# Patient Record
Sex: FEMALE | Race: WHITE | URBAN - METROPOLITAN AREA
[De-identification: names, ages, dates, MRNs, and addresses within clinical notes are randomized per-mention and may not be internally consistent; named-entity substitution may affect disease eponyms.]

---

## 2017-01-02 DIAGNOSIS — E03.8 OTHER SPECIFIED HYPOTHYROIDISM: Primary | ICD-10-CM

## 2017-01-02 NOTE — TELEPHONE ENCOUNTER
Change in pharmacy    Pending Prescriptions:                       Disp   Refills    levothyroxine (SYNTHROID/LEVOTHROID) 75 M*90 tab*3            Sig: Take 1 tablet (75 mcg) by mouth daily         Last Written Prescription Date: 10-11-16 to local pharm  Last Quantity: 90, # refills: 3  Last Office Visit with [facility], [facility] or [facility] prescribing provider: 12-20-16 Dr John        TSH   Date Value Ref Range Status   08/30/2016 0.21* 0.40 - 4.00 mU/L Final     RT eLe Ann (R)

## 2017-01-03 RX ORDER — LEVOTHYROXINE SODIUM 75 UG/1
75 TABLET ORAL DAILY
Qty: 90 TABLET | Refills: 2 | Status: SHIPPED | OUTPATIENT
Start: 2017-01-03 | End: 2017-09-25

## 2017-01-03 NOTE — TELEPHONE ENCOUNTER
Prescription approved per [facility] Refill Protocol.  Fadia Em RN

## 2017-01-07 ENCOUNTER — MYC MEDICAL ADVICE (OUTPATIENT)
Dept: FAMILY MEDICINE | Facility: CLINIC | Age: 27
End: 2017-01-07

## 2017-03-31 DIAGNOSIS — Z30.019 ENCOUNTER FOR INITIAL PRESCRIPTION OF CONTRACEPTIVES: ICD-10-CM

## 2017-03-31 RX ORDER — ETONOGESTREL/ETHINYL ESTRADIOL .12-.015MG
RING, VAGINAL VAGINAL
Qty: 3 EACH | Refills: 1 | Status: SHIPPED | OUTPATIENT
Start: 2017-03-31 | End: 2017-09-15

## 2017-03-31 NOTE — TELEPHONE ENCOUNTER
Prescription approved per [facility] Refill Protocol.  Mariann Stark RN

## 2017-03-31 NOTE — TELEPHONE ENCOUNTER
Nuvaring      Last Written Prescription Date: 08/30/16  Last Fill Quantity: 3 each, # refills: 1  Last Office Visit with [facility], [facility] or [facility] prescribing provider: 12/20/16       BP Readings from Last 3 Encounters:   12/20/16 131/86   08/30/16 119/82   05/02/16 128/88     Date of last Breast Exam: 12/20/16    Ashley Mckeon MA

## 2017-05-07 DIAGNOSIS — F41.9 ANXIETY: ICD-10-CM

## 2017-05-08 NOTE — TELEPHONE ENCOUNTER
sertraline (ZOLOFT) 100 MG tablet 90 tablet 1 8/30/2016       Last Written Prescription Date: 08/30/2016  Last Fill Quantity: 90, # refills: 1  Last Office Visit with [facility] primary care provider:  12/20/2016       Last PHQ-9 score on record=   PHQ-9 SCORE 3/14/2014   Total Score 0

## 2017-05-09 RX ORDER — SERTRALINE HYDROCHLORIDE 100 MG/1
TABLET, FILM COATED ORAL
Qty: 90 TABLET | Refills: 1 | Status: SHIPPED | OUTPATIENT
Start: 2017-05-09 | End: 2017-10-30

## 2017-09-15 DIAGNOSIS — Z30.019 ENCOUNTER FOR INITIAL PRESCRIPTION OF CONTRACEPTIVES: ICD-10-CM

## 2017-09-15 RX ORDER — ETONOGESTREL/ETHINYL ESTRADIOL .12-.015MG
RING, VAGINAL VAGINAL
Qty: 3 EACH | Refills: 0 | Status: SHIPPED | OUTPATIENT
Start: 2017-09-15 | End: 2017-11-17

## 2017-09-15 NOTE — TELEPHONE ENCOUNTER
NUVARING 0.12-0.015 MG/24HR vaginal ring        Last Written Prescription Date: 3/31/2017  Last Fill Quantity: 3,  # refills: 1   Last Office Visit with [facility], [facility] or [facility] prescribing provider: 12/20/2016

## 2017-09-15 NOTE — TELEPHONE ENCOUNTER
Prescription approved per [facility] Refill Protocol.  Fadia Em RN

## 2017-09-25 DIAGNOSIS — E03.8 OTHER SPECIFIED HYPOTHYROIDISM: ICD-10-CM

## 2017-09-25 NOTE — TELEPHONE ENCOUNTER
levothyroxine (SYNTHROID/LEVOTHROID) 75 MCG tablet       Last Written Prescription Date: 1/3/2017  Last Quantity: 90, # refills: 2  Last Office Visit with [facility], [facility] or [facility] prescribing provider: 12/20/2016        TSH   Date Value Ref Range Status   08/30/2016 0.21 (L) 0.40 - 4.00 mU/L Final

## 2017-09-26 RX ORDER — LEVOTHYROXINE SODIUM 75 UG/1
TABLET ORAL
Qty: 90 TABLET | Refills: 0 | Status: SHIPPED | OUTPATIENT
Start: 2017-09-26 | End: 2017-11-18

## 2017-09-27 NOTE — TELEPHONE ENCOUNTER
Rx sent in  Per MyChart visit earlier this year, she was considering pregnancy  Please call and see if that is the case. If so, due for TSH sooner rather than later.  Otherwise, ok to wait on lab until office visit due later this year.  Thanks!

## 2017-10-28 ENCOUNTER — MYC MEDICAL ADVICE (OUTPATIENT)
Dept: FAMILY MEDICINE | Facility: CLINIC | Age: 27
End: 2017-10-28

## 2017-10-30 DIAGNOSIS — F41.9 ANXIETY: ICD-10-CM

## 2017-10-30 DIAGNOSIS — I47.10 SVT (SUPRAVENTRICULAR TACHYCARDIA) (H): Primary | Chronic | ICD-10-CM

## 2017-10-30 NOTE — TELEPHONE ENCOUNTER
Dr John, please read patient's [facility] message and provide any additional advise.   Patient is scheduled to see you 12-29-17.    Do you have any recommendations for OB in [facility]?    Josette CHAIREZ RN,BSN

## 2017-10-31 RX ORDER — METOPROLOL TARTRATE 25 MG/1
TABLET, FILM COATED ORAL
Qty: 180 TABLET | Refills: 0 | Status: SHIPPED | OUTPATIENT
Start: 2017-10-31 | End: 2018-04-11

## 2017-10-31 RX ORDER — SERTRALINE HYDROCHLORIDE 100 MG/1
TABLET, FILM COATED ORAL
Qty: 90 TABLET | Refills: 0 | Status: SHIPPED | OUTPATIENT
Start: 2017-10-31 | End: 2017-11-18

## 2017-10-31 NOTE — TELEPHONE ENCOUNTER
Prescription approved per [facility] Refill Protocol.  Brittni Sr RN    Sertraline  Takes for anxiety  Last Written Prescription Date: 5/9/2017  Last Fill Quantity: 90, # refills: 1  Last Office Visit with [facility] primary care provider:  12/20/2016   Next 5 appointments (look out 90 days)     Dec 29, 2017  7:30 AM CST   [facility]    [address] [facility] [address]   [phone]                   Metoprolol     Last Written Prescription Date: 8/30/2016  Last Fill Quantity: 180, # refills: 3  Last Office Visit with [facility], [facility] or [facility] prescribing provider: 12/20/2016  Next 5 appointments (look out 90 days)     Dec 29, 2017  7:30 AM CST   [facility]   [facility]    [address] [facility] [address]   [phone]                 BP Readings from Last 3 Encounters:   12/20/16 131/86   08/30/16 119/82   05/02/16 128/88

## 2017-11-01 NOTE — TELEPHONE ENCOUNTER
Dr John,  Please see pt's below response.  You are booked up fully on Fridays for November.  Do you want to work pt on on a Friday after 3pm as she noted below?  Please advise.  Marie VILLA RN

## 2017-11-02 NOTE — TELEPHONE ENCOUNTER
To PCP:  Please see below mychart message.  Patient has been added to your scheduled on 11/17 at 2:30.  Thank you.  Fadia Em RN

## 2017-11-17 ENCOUNTER — OFFICE VISIT (OUTPATIENT)
Dept: FAMILY MEDICINE | Facility: CLINIC | Age: 27
End: 2017-11-17
Payer: COMMERCIAL

## 2017-11-17 VITALS
HEIGHT: 63 IN | HEART RATE: 110 BPM | BODY MASS INDEX: 34.73 KG/M2 | WEIGHT: 196 LBS | TEMPERATURE: 98.7 F | SYSTOLIC BLOOD PRESSURE: 112 MMHG | DIASTOLIC BLOOD PRESSURE: 82 MMHG | OXYGEN SATURATION: 95 %

## 2017-11-17 DIAGNOSIS — E03.8 OTHER SPECIFIED HYPOTHYROIDISM: Primary | Chronic | ICD-10-CM

## 2017-11-17 DIAGNOSIS — I47.10 SVT (SUPRAVENTRICULAR TACHYCARDIA) (H): Chronic | ICD-10-CM

## 2017-11-17 DIAGNOSIS — F41.9 ANXIETY: Chronic | ICD-10-CM

## 2017-11-17 LAB
ERYTHROCYTE [DISTWIDTH] IN BLOOD BY AUTOMATED COUNT: 13.8 % (ref 10–15)
HCT VFR BLD AUTO: 39.6 % (ref 35–47)
HGB BLD-MCNC: 13.2 G/DL (ref 11.7–15.7)
MCH RBC QN AUTO: 28 PG (ref 26.5–33)
MCHC RBC AUTO-ENTMCNC: 33.3 G/DL (ref 31.5–36.5)
MCV RBC AUTO: 84 FL (ref 78–100)
PLATELET # BLD AUTO: 232 10E9/L (ref 150–450)
RBC # BLD AUTO: 4.72 10E12/L (ref 3.8–5.2)
WBC # BLD AUTO: 7.9 10E9/L (ref 4–11)

## 2017-11-17 PROCEDURE — 84439 ASSAY OF FREE THYROXINE: CPT | Performed by: INTERNAL MEDICINE

## 2017-11-17 PROCEDURE — 80053 COMPREHEN METABOLIC PANEL: CPT | Performed by: INTERNAL MEDICINE

## 2017-11-17 PROCEDURE — 99214 OFFICE O/P EST MOD 30 MIN: CPT | Performed by: INTERNAL MEDICINE

## 2017-11-17 PROCEDURE — 85027 COMPLETE CBC AUTOMATED: CPT | Performed by: INTERNAL MEDICINE

## 2017-11-17 PROCEDURE — 36415 COLL VENOUS BLD VENIPUNCTURE: CPT | Performed by: INTERNAL MEDICINE

## 2017-11-17 PROCEDURE — 84443 ASSAY THYROID STIM HORMONE: CPT | Performed by: INTERNAL MEDICINE

## 2017-11-17 NOTE — NURSING NOTE
"Chief Complaint   Patient presents with     Recheck Medication        Initial /85 (BP Location: Right arm, Patient Position: Chair, Cuff Size: Adult Regular)  Pulse 122  Temp 98.7  F (37.1  C) (Tympanic)  Ht 5' 3\" (1.6 m)  Wt 196 lb (88.9 kg)  LMP 11/08/2017  SpO2 95%  BMI 34.72 kg/m2 Estimated body mass index is 34.72 kg/(m^2) as calculated from the following:    Height as of this encounter: 5' 3\" (1.6 m).    Weight as of this encounter: 196 lb (88.9 kg)..    BP completed using cuff size: regular  MEDICATIONS REVIEWED  SOCIAL AND FAMILY HX REVIEWED  Rosibel Chaudhry CMA  "

## 2017-11-17 NOTE — PROGRESS NOTES
"  SUBJECTIVE:   Prema Cabezas is a 27 year old female who presents to clinic today for the following health issues:    She and her  are mutually ready to plan on their first pregnancy  She has historically had regular periods with the NuvaRing; she took this out recently and had her period and not they are not using protection  She is taking a daily prenatal vitamin; no alcohol  Works as a teacher  Has h/o anxiety, SVT and hypothyroidism  On Metoprolol for SVT after cardiology consultation which has been very helpful for her; still sometimes feels palpitations or ectopy but overall feels this is much better controlled with the beta-blocker  Doesn't check BP at home at all   BP was up a bit today as is HR but is slightly rushed/anxious and doesn't like getting her BP checked  Is compliant with her sertraline and is not taking Xanax at all  Drinks just one cup of coffee per day    Problem list and histories reviewed & adjusted, as indicated.    ROS:  Detailed as above     OBJECTIVE:     /82  Pulse 110  Temp 98.7  F (37.1  C) (Tympanic)  Ht 5' 3\" (1.6 m)  Wt 196 lb (88.9 kg)  LMP 11/08/2017  SpO2 95%  BMI 34.72 kg/m2  Body mass index is 34.72 kg/(m^2).  Alert, pleasant, NAD  No goiter or thyroid nodules nor tenderness  Heart regular tachy without mrg   Mood euthymic      ASSESSMENT/PLAN:     1. Hypothyroidism  Dose has remained stable with good control as of recently  Thyroid should be followed closely during pregnancy as often dose adjustments are needed and we discussed this - either I or her OBGYN should follow  TSH should be in lower-half of normal range  Per UpToDate,    Given that T4 dose requirements may increase during pregnancy in women with preexisting hypothyroidism, hypothyroid women who are newly pregnant should preemptively increase their levothyroxine dose by approximately 30 percent and notify their clinician promptly. We typically accomplish this by increasing the dose from " once-daily dosing to a total of nine doses per week (double the daily dose two days each week). Further dose changes are made based upon serum TSH concentrations measured every four weeks until the TSH becomes normal.   Another approach is to measure serum TSH as soon as pregnancy is confirmed, then again four weeks later, four weeks after any change in the dose of T4, and at least once each trimester. The dose should be adjusted as needed every four weeks to achieve a normal TSH level.  - TSH  - T4, free  - levothyroxine (SYNTHROID/LEVOTHROID) 75 MCG tablet; TAKE 1 TABLET (75 MCG) BY MOUTH DAILY  Dispense: 90 tablet; Refill: 3    2. Anxiety  Sertraline is very helpful for her and she is relieved that she may continue this during pregnancy as this is a well studied SSRI; she doesn't think she can go without it  However, I told her absolutely no Xanax and she is aware of that and hasn't been taking it (hasn't been filled for a couple years anyway)  - sertraline (ZOLOFT) 100 MG tablet; TAKE 1 TABLET (100 MG) BY MOUTH DAILY  Dispense: 90 tablet; Refill: 3    3. SVT (supraventricular tachycardia) (H)  HR is a bit tachy today - she needs to monitor and have close f/u  Labs to be checked today  Will discuss with OB colleagues about if they'd generally prefer Labetalol rather than her Metoprolol  - CBC with platelets  - Comprehensive metabolic panel    Patient Instructions   Labs today  I'll be in contact with you for dose adjustments as needed via MyChart  Pick OBGYRAUL near you for future reference  Continue prenatal (400 mcg folic acid)        Sylvia John,   [facility]

## 2017-11-17 NOTE — PATIENT INSTRUCTIONS
Labs today  I'll be in contact with you for dose adjustments as needed via MyChart  Pick OBGYN near you for future reference  Continue prenatal (400 mcg folic acid)

## 2017-11-17 NOTE — MR AVS SNAPSHOT
After Visit Summary   11/17/2017    Prema Cabezas    MRN: [id number]           Patient Information     Date Of Birth          1990        Visit Information        Provider Department      11/17/2017 2:30 PM Sylvia John,  [facility]        Today's Diagnoses     Other specified hypothyroidism    -  1    Anxiety        SVT (supraventricular tachycardia) (H)          Care Instructions    Labs today  I'll be in contact with you for dose adjustments as needed via [employer]  Pick OBGYN near you for future reference  Continue prenatal (400 mcg folic acid)            Follow-ups after your visit        Your next 10 appointments already scheduled     Dec 29, 2017  7:30 AM CST   Anjum Mcdermott with Sylvia John DO   [facility]    [address] [facility] [address] [phone]              Who to contact     If you have questions or need follow up information about today's clinic visit or your schedule please contact [facility] directly at [phone].  Normal or non-critical lab and imaging results will be communicated to you by [employer], letter or phone within 4 business days after the clinic has received the results. If you do not hear from us within 7 days, please contact the clinic through [employer] or phone. If you have a critical or abnormal lab result, we will notify you by phone as soon as possible.  Submit refill requests through efw-suhl or call your pharmacy and they will forward the refill request to us. Please allow 3 business days for your refill to be completed.          Additional Information About Your Visit        [employer] Information     efw-suhl gives you secure access to your electronic health record. If you see a primary care provider, you can also send messages to your care team and make appointments. If you have questions, please call your primary care clinic.  If you do not have a primary care provider,  "please call [phone] and they will assist you.        Care EveryWhere ID     This is your Care EveryWhere ID. This could be used by other organizations to access your [facility] medical records  [id number]        Your Vitals Were     Pulse Temperature Height Last Period Pulse Oximetry BMI (Body Mass Index)    110 98.7  F (37.1  C) (Tympanic) 5' 3\" (1.6 m) 11/08/2017 95% 34.72 kg/m2       Blood Pressure from Last 3 Encounters:   11/17/17 112/82   12/20/16 131/86   08/30/16 119/82    Weight from Last 3 Encounters:   11/17/17 196 lb (88.9 kg)   12/20/16 183 lb (83 kg)   08/30/16 182 lb (82.6 kg)              We Performed the Following     CBC with platelets     Comprehensive metabolic panel     T4, free     TSH          Today's Medication Changes          These changes are accurate as of: 11/17/17  3:25 PM.  If you have any questions, ask your nurse or doctor.               Stop taking these medicines if you haven't already. Please contact your care team if you have questions.     ALPRAZolam 0.5 MG tablet   Commonly known as:  XANAX   Stopped by:  Sylvia John DO                    Primary Care Provider Office Phone # Fax #    Sylvia John [phone] [address] [facility] [address] [address]        Equal Access to Services     [facility]: Hadii erin weeks hadasho Sostepanali, waaxda luqadaha, qaybta kaalmada andrzej nelson . So [facility] [phone].    ATENCIÓN: Si habla español, tiene a sawant disposición servicios gratuitos de asistencia lingüística. Llame al [phone].    We comply with applicable federal civil rights laws and [place] laws. We do not discriminate on the basis of race, color, national origin, age, disability, sex, sexual orientation, or gender identity.            Thank you!     Thank you for choosing [facility]  for your care. Our goal is always to provide you with excellent care. Hearing back from our patients is one " way we can continue to improve our services. Please take a few minutes to complete the written survey that you may receive in the mail after your visit with us. Thank you!             Your Updated Medication List - Protect others around you: Learn how to safely use, store and throw away your medicines at www.disposemymeds.org.          This list is accurate as of: 11/17/17  3:25 PM.  Always use your most recent med list.                   Brand Name Dispense Instructions for use Diagnosis    levothyroxine 75 MCG tablet    SYNTHROID/LEVOTHROID    90 tablet    TAKE 1 TABLET (75 MCG) BY MOUTH DAILY    Other specified hypothyroidism       metoprolol 25 MG tablet    LOPRESSOR    180 tablet    TAKE 1 TABLET (25 MG) BY MOUTH 2 TIMES DAILY    SVT (supraventricular tachycardia) (H)       MULTI COMPLETE PO           sertraline 100 MG tablet    ZOLOFT    90 tablet    TAKE 1 TABLET (100 MG) BY MOUTH DAILY    Anxiety

## 2017-11-18 LAB
ALBUMIN SERPL-MCNC: 3.7 G/DL (ref 3.4–5)
ALP SERPL-CCNC: 123 U/L (ref 40–150)
ALT SERPL W P-5'-P-CCNC: 27 U/L (ref 0–50)
ANION GAP SERPL CALCULATED.3IONS-SCNC: 9 MMOL/L (ref 3–14)
AST SERPL W P-5'-P-CCNC: 21 U/L (ref 0–45)
BILIRUB SERPL-MCNC: 0.2 MG/DL (ref 0.2–1.3)
BUN SERPL-MCNC: 12 MG/DL (ref 7–30)
CALCIUM SERPL-MCNC: 9 MG/DL (ref 8.5–10.1)
CHLORIDE SERPL-SCNC: 104 MMOL/L (ref 94–109)
CO2 SERPL-SCNC: 25 MMOL/L (ref 20–32)
CREAT SERPL-MCNC: 0.53 MG/DL (ref 0.52–1.04)
GFR SERPL CREATININE-BSD FRML MDRD: >90 ML/MIN/1.7M2
GLUCOSE SERPL-MCNC: 92 MG/DL (ref 70–99)
POTASSIUM SERPL-SCNC: 4 MMOL/L (ref 3.4–5.3)
PROT SERPL-MCNC: 7.5 G/DL (ref 6.8–8.8)
SODIUM SERPL-SCNC: 138 MMOL/L (ref 133–144)
T4 FREE SERPL-MCNC: 1.12 NG/DL (ref 0.76–1.46)
TSH SERPL DL<=0.005 MIU/L-ACNC: 0.76 MU/L (ref 0.4–4)

## 2017-11-18 RX ORDER — SERTRALINE HYDROCHLORIDE 100 MG/1
TABLET, FILM COATED ORAL
Qty: 90 TABLET | Refills: 3 | Status: SHIPPED | OUTPATIENT
Start: 2017-11-18

## 2017-11-18 RX ORDER — LEVOTHYROXINE SODIUM 75 UG/1
TABLET ORAL
Qty: 90 TABLET | Refills: 3 | Status: SHIPPED | OUTPATIENT
Start: 2017-11-18

## 2017-12-04 ENCOUNTER — MYC MEDICAL ADVICE (OUTPATIENT)
Dept: FAMILY MEDICINE | Facility: CLINIC | Age: 27
End: 2017-12-04

## 2017-12-17 ENCOUNTER — MYC MEDICAL ADVICE (OUTPATIENT)
Dept: FAMILY MEDICINE | Facility: CLINIC | Age: 27
End: 2017-12-17

## 2018-01-14 DIAGNOSIS — E03.8 OTHER SPECIFIED HYPOTHYROIDISM: ICD-10-CM

## 2018-01-15 NOTE — TELEPHONE ENCOUNTER
"Requested Prescriptions   Pending Prescriptions Disp Refills     levothyroxine (SYNTHROID/LEVOTHROID) 75 MCG tablet [Pharmacy Med Name: LEVOTHYROXINE 75 MCG TABLET] 90 tablet 0     Sig: TAKE 1 TABLET (75 MCG) BY MOUTH DAILY    Thyroid Protocol Passed    1/14/2018  1:36 PM       Passed - Patient is 12 years or older       Passed - Recent or future visit with authorizing provider's specialty    Patient had office visit in the last year or has a visit in the next 30 days with authorizing provider.  See \"Patient Info\" tab in inbasket, or \"Choose Columns\" in Meds & Orders section of the refill encounter.              Passed - Normal TSH on file in past 12 months    Recent Labs   Lab Test  11/17/17   1533   TSH  0.76             Passed - No active pregnancy on record    If patient is pregnant or has had a positive pregnancy test, please check TSH.         Passed - No positive pregnancy test in past 12 months    If patient is pregnant or has had a positive pregnancy test, please check TSH.          levothyroxine (SYNTHROID/LEVOTHROID) 75 MCG tablet 90 tablet 3 11/18/2017       Last Written Prescription Date:  11/1/8/17  Last Fill Quantity: 90,  # refills: 3   Last Office Visit with [facility], P or [facility] prescribing provider:  11/17/17   Future Office Visit:   none  PHQ-9 SCORE 12/21/2012 3/14/2014   Total Score 3 0       "

## 2018-01-16 RX ORDER — LEVOTHYROXINE SODIUM 75 UG/1
TABLET ORAL
Refills: 0
Start: 2018-01-16

## 2018-01-27 ENCOUNTER — MYC MEDICAL ADVICE (OUTPATIENT)
Dept: FAMILY MEDICINE | Facility: CLINIC | Age: 28
End: 2018-01-27

## 2018-01-27 DIAGNOSIS — E03.9 HYPOTHYROIDISM, UNSPECIFIED TYPE: Primary | Chronic | ICD-10-CM

## 2018-02-02 DIAGNOSIS — E03.9 HYPOTHYROIDISM, UNSPECIFIED TYPE: Chronic | ICD-10-CM

## 2018-02-02 PROCEDURE — 84439 ASSAY OF FREE THYROXINE: CPT | Performed by: INTERNAL MEDICINE

## 2018-02-02 PROCEDURE — 84443 ASSAY THYROID STIM HORMONE: CPT | Performed by: INTERNAL MEDICINE

## 2018-02-02 PROCEDURE — 36415 COLL VENOUS BLD VENIPUNCTURE: CPT | Performed by: INTERNAL MEDICINE

## 2018-02-04 DIAGNOSIS — E03.9 HYPOTHYROIDISM, UNSPECIFIED TYPE: Primary | Chronic | ICD-10-CM

## 2018-02-04 LAB
T4 FREE SERPL-MCNC: 1.13 NG/DL (ref 0.76–1.46)
TSH SERPL DL<=0.005 MIU/L-ACNC: 1.13 MU/L (ref 0.4–4)

## 2018-02-04 NOTE — PROGRESS NOTES
"Prema,  Both of your thyroid tests remain within normal limits. Here is the follow up on monitoring that I suggest:  1) Measure serum TSH as soon as pregnancy is confirmed (just done)  2) Then labs again four weeks later (please schedule another \"lab only\" visit for four weeks from now)  3) Also, four weeks after any change in medication dosing that may or may not occur during the pregnancy  4) And at least once each trimester and/or as directed by your OBGYN    Please let me know if you have any questions or concerns. Thanks!"

## 2018-02-26 ENCOUNTER — MYC MEDICAL ADVICE (OUTPATIENT)
Dept: FAMILY MEDICINE | Facility: CLINIC | Age: 28
End: 2018-02-26

## 2018-02-27 NOTE — TELEPHONE ENCOUNTER
Please review [employer] message and advise as appropriate. Would you like an E Visit or telephone visit for this patient?    Thank you,    Zainab Birmingham RN  [employer]

## 2018-02-28 DIAGNOSIS — E03.9 HYPOTHYROIDISM, UNSPECIFIED TYPE: Chronic | ICD-10-CM

## 2018-02-28 LAB
T4 FREE SERPL-MCNC: 0.91 NG/DL (ref 0.76–1.46)
TSH SERPL DL<=0.005 MIU/L-ACNC: 1.37 MU/L (ref 0.4–4)

## 2018-02-28 PROCEDURE — 36415 COLL VENOUS BLD VENIPUNCTURE: CPT | Performed by: INTERNAL MEDICINE

## 2018-02-28 PROCEDURE — 84439 ASSAY OF FREE THYROXINE: CPT | Performed by: INTERNAL MEDICINE

## 2018-02-28 PROCEDURE — 84443 ASSAY THYROID STIM HORMONE: CPT | Performed by: INTERNAL MEDICINE

## 2018-03-14 ENCOUNTER — MYC MEDICAL ADVICE (OUTPATIENT)
Dept: FAMILY MEDICINE | Facility: CLINIC | Age: 28
End: 2018-03-14

## 2018-03-15 NOTE — TELEPHONE ENCOUNTER
Please review [employer] message and advise as appropriate.    Thank you,    Zainab Birmingham, RN  [employer]

## 2018-03-29 ENCOUNTER — MYC MEDICAL ADVICE (OUTPATIENT)
Dept: FAMILY MEDICINE | Facility: CLINIC | Age: 28
End: 2018-03-29

## 2018-03-29 DIAGNOSIS — F41.9 ANXIETY: ICD-10-CM

## 2018-03-29 RX ORDER — ALPRAZOLAM 0.5 MG
TABLET ORAL
Qty: 20 TABLET | Refills: 0 | Status: SHIPPED | OUTPATIENT
Start: 2018-03-29

## 2018-03-29 NOTE — TELEPHONE ENCOUNTER
Dr. John:  Please see [employer] message below.     Patient requesting Xanax to be filled during miscarriage.     Please advise. Started to pend medications.    Sherly Alejandra RN

## 2018-03-29 NOTE — TELEPHONE ENCOUNTER
Left message for patient to return our call.  Need to know what pharmacy to fax RX to.  RX is in the waiting for response jessika.  Rosibel Chaudhry CMA

## 2018-04-11 DIAGNOSIS — I47.10 SVT (SUPRAVENTRICULAR TACHYCARDIA) (H): Chronic | ICD-10-CM

## 2018-04-11 NOTE — TELEPHONE ENCOUNTER
"metoprolol (LOPRESSOR) 25 MG tablet 180 tablet 0 10/31/2017     Last Written Prescription Date:  10/31/17  Last Fill Quantity: 180,  # refills: 0   Last office visit: 11/17/2017 with prescribing provider:  Dora   Future Office Visit:  none    Requested Prescriptions   Pending Prescriptions Disp Refills     metoprolol tartrate (LOPRESSOR) 25 MG tablet [Pharmacy Med Name: METOPROLOL TARTRATE 25 MG TAB] 180 tablet 0     Sig: TAKE 1 TABLET (25 MG) BY MOUTH 2 TIMES DAILY    Beta-Blockers Protocol Passed    4/11/2018  1:19 AM       Passed - Blood pressure under 140/90 in past 12 months    BP Readings from Last 3 Encounters:   11/17/17 112/82   12/20/16 131/86   08/30/16 119/82                Passed - Patient is age 6 or older       Passed - Recent (12 mo) or future (30 days) visit within the authorizing provider's specialty    Patient had office visit in the last 12 months or has a visit in the next 30 days with authorizing provider or within the authorizing provider's specialty.  See \"Patient Info\" tab in inbasket, or \"Choose Columns\" in Meds & Orders section of the refill encounter.            PHQ-9 SCORE 12/21/2012 3/14/2014   Total Score 3 0     "

## 2018-04-12 RX ORDER — METOPROLOL TARTRATE 25 MG/1
TABLET, FILM COATED ORAL
Qty: 180 TABLET | Refills: 3 | Status: SHIPPED | OUTPATIENT
Start: 2018-04-12

## 2018-04-12 NOTE — TELEPHONE ENCOUNTER
Dr. John,  Routing refill request to provider for review/approval because:  Drug not on the [facility] refill protocol for Dx: SVT  Thanks, Brittni Sr RN

## 2018-04-15 ENCOUNTER — MYC MEDICAL ADVICE (OUTPATIENT)
Dept: FAMILY MEDICINE | Facility: CLINIC | Age: 28
End: 2018-04-15

## 2018-04-18 NOTE — TELEPHONE ENCOUNTER
STEPHANIE PCP:     Please see pt's latest MyChart thanking you for your explanation.    Kerry SCHAEFER RN

## 2018-06-13 ENCOUNTER — MYC MEDICAL ADVICE (OUTPATIENT)
Dept: FAMILY MEDICINE | Facility: CLINIC | Age: 28
End: 2018-06-13

## 2018-06-13 DIAGNOSIS — E03.9 HYPOTHYROIDISM, UNSPECIFIED TYPE: Primary | Chronic | ICD-10-CM

## 2020-02-24 ENCOUNTER — HEALTH MAINTENANCE LETTER (OUTPATIENT)
Age: 30
End: 2020-02-24

## 2020-12-13 ENCOUNTER — HEALTH MAINTENANCE LETTER (OUTPATIENT)
Age: 30
End: 2020-12-13

## 2021-04-17 ENCOUNTER — HEALTH MAINTENANCE LETTER (OUTPATIENT)
Age: 31
End: 2021-04-17

## 2021-09-26 ENCOUNTER — HEALTH MAINTENANCE LETTER (OUTPATIENT)
Age: 31
End: 2021-09-26

## 2022-05-08 ENCOUNTER — HEALTH MAINTENANCE LETTER (OUTPATIENT)
Age: 32
End: 2022-05-08

## 2023-04-23 ENCOUNTER — HEALTH MAINTENANCE LETTER (OUTPATIENT)
Age: 33
End: 2023-04-23

## 2023-06-02 ENCOUNTER — HEALTH MAINTENANCE LETTER (OUTPATIENT)
Age: 33
End: 2023-06-02